# Patient Record
Sex: MALE | Race: OTHER
[De-identification: names, ages, dates, MRNs, and addresses within clinical notes are randomized per-mention and may not be internally consistent; named-entity substitution may affect disease eponyms.]

---

## 2023-05-13 ENCOUNTER — HOSPITAL ENCOUNTER (EMERGENCY)
Dept: HOSPITAL 54 - ER | Age: 43
LOS: 1 days | Discharge: HOME | End: 2023-05-14
Payer: SELF-PAY

## 2023-05-13 VITALS — WEIGHT: 210 LBS | HEIGHT: 74 IN | BODY MASS INDEX: 26.95 KG/M2

## 2023-05-13 DIAGNOSIS — T65.891A: Primary | ICD-10-CM
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
## 2023-05-13 PROCEDURE — 99283 EMERGENCY DEPT VISIT LOW MDM: CPT

## 2023-05-14 VITALS — DIASTOLIC BLOOD PRESSURE: 76 MMHG | SYSTOLIC BLOOD PRESSURE: 183 MMHG
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
